# Patient Record
Sex: FEMALE | Race: BLACK OR AFRICAN AMERICAN | NOT HISPANIC OR LATINO | Employment: UNEMPLOYED | URBAN - METROPOLITAN AREA
[De-identification: names, ages, dates, MRNs, and addresses within clinical notes are randomized per-mention and may not be internally consistent; named-entity substitution may affect disease eponyms.]

---

## 2017-01-12 ENCOUNTER — HOSPITAL ENCOUNTER (EMERGENCY)
Facility: HOSPITAL | Age: 32
Discharge: HOME | End: 2017-01-12
Attending: EMERGENCY MEDICINE | Admitting: EMERGENCY MEDICINE
Payer: COMMERCIAL

## 2017-01-12 ENCOUNTER — APPOINTMENT (EMERGENCY)
Dept: RADIOLOGY | Facility: HOSPITAL | Age: 32
End: 2017-01-12
Payer: COMMERCIAL

## 2017-01-12 VITALS
HEART RATE: 91 BPM | SYSTOLIC BLOOD PRESSURE: 113 MMHG | OXYGEN SATURATION: 99 % | TEMPERATURE: 98.4 F | WEIGHT: 200 LBS | RESPIRATION RATE: 14 BRPM | DIASTOLIC BLOOD PRESSURE: 56 MMHG

## 2017-01-12 DIAGNOSIS — S93.409A ANKLE SPRAIN: Primary | ICD-10-CM

## 2017-01-12 PROCEDURE — 99283 EMERGENCY DEPT VISIT LOW MDM: CPT

## 2017-01-12 PROCEDURE — 73630 X-RAY EXAM OF FOOT: CPT

## 2017-01-12 PROCEDURE — 73610 X-RAY EXAM OF ANKLE: CPT

## 2017-01-12 PROCEDURE — 96372 THER/PROPH/DIAG INJ SC/IM: CPT

## 2017-01-12 RX ORDER — KETOROLAC TROMETHAMINE 30 MG/ML
30 INJECTION, SOLUTION INTRAMUSCULAR; INTRAVENOUS ONCE
Status: COMPLETED | OUTPATIENT
Start: 2017-01-12 | End: 2017-01-12

## 2017-01-12 RX ORDER — PHENTERMINE HYDROCHLORIDE 15 MG/1
15 CAPSULE ORAL EVERY MORNING
COMMUNITY
End: 2022-03-28 | Stop reason: ALTCHOICE

## 2017-01-12 RX ADMIN — KETOROLAC TROMETHAMINE 30 MG: 30 INJECTION, SOLUTION INTRAMUSCULAR at 10:25

## 2019-03-25 ENCOUNTER — HOSPITAL ENCOUNTER (EMERGENCY)
Facility: HOSPITAL | Age: 34
Discharge: HOME | End: 2019-03-25
Attending: EMERGENCY MEDICINE | Admitting: EMERGENCY MEDICINE
Payer: COMMERCIAL

## 2019-03-25 VITALS
SYSTOLIC BLOOD PRESSURE: 133 MMHG | WEIGHT: 193 LBS | DIASTOLIC BLOOD PRESSURE: 95 MMHG | RESPIRATION RATE: 16 BRPM | TEMPERATURE: 98.1 F | OXYGEN SATURATION: 100 % | BODY MASS INDEX: 32.62 KG/M2 | HEART RATE: 99 BPM

## 2019-03-25 DIAGNOSIS — S09.20XA: Primary | ICD-10-CM

## 2019-03-25 PROCEDURE — 99282 EMERGENCY DEPT VISIT SF MDM: CPT

## 2019-03-25 NOTE — ED PROVIDER NOTES
Pt Name: Felicitas Mckeon  MRN: [id number]  [place] 1985  Age/Sex: 34 y o  female  Date of evaluation: 3/25/2019  PCP: Rhoda Proctor MD    [address] [place] [address]    Chief Complaint   Patient presents with    Ear Problem     was hit with a baskekball in the Left ear on saturday and "it started ringing then it went muffeled now it feels like there is water in it"         [facility] presents to the Emergency Department complaining of left ear fullness  She was hit in the ear on Saturday with a basketball  Since then it has not felt normal   She feels like there is unusual pressure and echoing sound  She is concerned that there is water in there but she does not have significant pain  HPI      Past Medical and Surgical History    History reviewed  No pertinent past medical history  Past Surgical History:   Procedure Laterality Date    ABDOMINAL SURGERY         History reviewed  No pertinent family history  Social History     Tobacco Use    Smoking status: Current Every Day Smoker     Types: Cigarettes   Substance Use Topics    Alcohol use: Yes    Drug use: No              Allergies    No Known Allergies    Home Medications    Prior to Admission medications    Medication Sig Start Date End Date Taking? Authorizing Provider   phentermine 15 MG capsule Take 15 mg by mouth every morning    Historical Provider, MD           Review of Systems    Review of Systems   Constitutional: Negative for activity change, appetite change, chills, diaphoresis, fatigue and fever  HENT: Positive for ear pain and hearing loss  Negative for congestion, postnasal drip, rhinorrhea, sinus pressure, sneezing and sore throat  Eyes: Negative for pain and visual disturbance  Respiratory: Negative for cough, chest tightness and shortness of breath  Cardiovascular: Negative for chest pain, palpitations and leg swelling     Gastrointestinal: Negative for abdominal distention, abdominal pain, constipation, diarrhea, nausea and vomiting  Endocrine: Negative for polydipsia, polyphagia and polyuria  Genitourinary: Negative for decreased urine volume, difficulty urinating, dysuria, flank pain, frequency and hematuria  Musculoskeletal: Negative for arthralgias, gait problem, joint swelling and neck pain  Skin: Negative for pallor and rash  Allergic/Immunologic: Negative for immunocompromised state  Neurological: Negative for syncope, speech difficulty, weakness, light-headedness, numbness and headaches  All other systems reviewed and are negative  Physical Exam      ED Triage Vitals [03/25/19 1117]   Temperature Pulse Respirations Blood Pressure SpO2   98 1 °F (36 7 °C) 99 16 133/95 100 %      Temp Source Heart Rate Source Patient Position - Orthostatic VS BP Location FiO2 (%)   Tympanic Monitor Sitting Right arm --      Pain Score       8               Physical Exam   Constitutional: She is oriented to person, place, and time  She appears well-developed and well-nourished  No distress  HENT:   Head: Normocephalic and atraumatic  Left Ear: Tympanic membrane is perforated  Ears:    Nose: Nose normal    Mouth/Throat: Oropharynx is clear and moist    Eyes: Pupils are equal, round, and reactive to light  Conjunctivae, EOM and lids are normal    Neck: Normal range of motion  Neck supple  Cardiovascular: Normal rate, regular rhythm and normal heart sounds  Exam reveals no gallop and no friction rub  No murmur heard  Pulmonary/Chest: Effort normal and breath sounds normal  No accessory muscle usage  No respiratory distress  She has no wheezes  She has no rales  Abdominal: Soft  She exhibits no distension  There is no tenderness  There is no rebound and no guarding  Neurological: She is alert and oriented to person, place, and time  No cranial nerve deficit or sensory deficit  Skin: Skin is warm and dry  No rash noted  She is not diaphoretic  No erythema  Psychiatric: She has a normal mood and affect  Her speech is normal and behavior is normal  Judgment and thought content normal    Nursing note and vitals reviewed  Assessment and Plan    Alexi Renner is a 34 y o  female who presents with abnormal hearing/ ear fullness  Physical examination remarkable for TM rupture  Plan will be to refer to ENT as an outpatient  I discussed with the patient to avoid water in the ear  [facility]    Diagnostic Results        Medications - No data to display        FINAL IMPRESSION    Final diagnoses:   Traumatic rupture of tympanic membrane, initial encounter         DISPOSITION/PLAN      Time reflects when diagnosis was documented in both MDM as applicable and the Disposition within this note     Time User Action Codes Description Comment    3/25/2019 11:27 AM Caroline Jean Add [S09 [id number]] Traumatic rupture of tympanic membrane, initial encounter       ED Disposition     ED Disposition Condition Date/Time Comment    Discharge Stable Mon Mar 25, 2019 11:26 AM Alexi Renner discharge to home/self care              Follow-up Information     Follow up With Specialties Details Why Contact Info Additional Information    Hira Vieira MD [facility]  [address] [facility]  49  [facility] 43        [address] E[facility] Otolaryngology Schedule an appointment as soon as possible for a visit   [address] [facility] [address]  [address], [address] [place] [address], [id number]            PATIENT REFERRED TO:    Hira Vieira MD  [address] [place] [address]  [address]  300 [facility] ENT  120 [facility] [address] [phone]  Schedule an appointment as soon as possible for a visit         DISCHARGE MEDICATIONS:    Discharge Medication List as of 3/25/2019 11:28 AM      CONTINUE these medications which have NOT CHANGED    Details   phentermine 15 MG capsule Take 15 mg by mouth every morning, Until Discontinued, Historical Med             No discharge procedures on file           Camacho Bolus, DO     Camacho Bolus, DO  03/25/19 6152

## 2019-04-18 ENCOUNTER — APPOINTMENT (EMERGENCY)
Dept: RADIOLOGY | Facility: HOSPITAL | Age: 34
End: 2019-04-18
Payer: COMMERCIAL

## 2019-04-18 ENCOUNTER — HOSPITAL ENCOUNTER (EMERGENCY)
Facility: HOSPITAL | Age: 34
Discharge: HOME | End: 2019-04-18
Attending: EMERGENCY MEDICINE | Admitting: EMERGENCY MEDICINE
Payer: COMMERCIAL

## 2019-04-18 VITALS
RESPIRATION RATE: 20 BRPM | DIASTOLIC BLOOD PRESSURE: 77 MMHG | HEART RATE: 98 BPM | OXYGEN SATURATION: 100 % | TEMPERATURE: 99.1 F | SYSTOLIC BLOOD PRESSURE: 120 MMHG

## 2019-04-18 DIAGNOSIS — S90.31XA CONTUSION OF RIGHT FOOT, INITIAL ENCOUNTER: ICD-10-CM

## 2019-04-18 DIAGNOSIS — S91.311A LACERATION OF RIGHT FOOT, INITIAL ENCOUNTER: Primary | ICD-10-CM

## 2019-04-18 DIAGNOSIS — Z23 NEED FOR TETANUS, DIPHTHERIA, AND ACELLULAR PERTUSSIS (TDAP) VACCINE: ICD-10-CM

## 2019-04-18 PROCEDURE — 90471 IMMUNIZATION ADMIN: CPT

## 2019-04-18 PROCEDURE — 90715 TDAP VACCINE 7 YRS/> IM: CPT | Performed by: PHYSICIAN ASSISTANT

## 2019-04-18 PROCEDURE — 73630 X-RAY EXAM OF FOOT: CPT

## 2019-04-18 PROCEDURE — 99283 EMERGENCY DEPT VISIT LOW MDM: CPT

## 2019-04-18 PROCEDURE — 99284 EMERGENCY DEPT VISIT MOD MDM: CPT | Performed by: PHYSICIAN ASSISTANT

## 2019-04-18 RX ORDER — NAPROXEN 500 MG/1
500 TABLET ORAL 2 TIMES DAILY WITH MEALS
Qty: 10 TABLET | Refills: 0 | Status: SHIPPED | OUTPATIENT
Start: 2019-04-18 | End: 2022-03-28 | Stop reason: ALTCHOICE

## 2019-04-18 RX ORDER — CEPHALEXIN 500 MG/1
500 CAPSULE ORAL 4 TIMES DAILY
Qty: 28 CAPSULE | Refills: 0 | Status: SHIPPED | OUTPATIENT
Start: 2019-04-18 | End: 2019-04-25

## 2019-04-18 RX ADMIN — TETANUS TOXOID, REDUCED DIPHTHERIA TOXOID AND ACELLULAR PERTUSSIS VACCINE, ADSORBED 0.5 ML: 5; 2.5; 8; 8; 2.5 SUSPENSION INTRAMUSCULAR at 22:18

## 2020-01-27 ENCOUNTER — HOSPITAL ENCOUNTER (EMERGENCY)
Facility: HOSPITAL | Age: 35
Discharge: HOME | End: 2020-01-27
Attending: EMERGENCY MEDICINE | Admitting: EMERGENCY MEDICINE
Payer: COMMERCIAL

## 2020-01-27 ENCOUNTER — APPOINTMENT (EMERGENCY)
Dept: ULTRASOUND IMAGING | Facility: HOSPITAL | Age: 35
End: 2020-01-27
Payer: COMMERCIAL

## 2020-01-27 VITALS
RESPIRATION RATE: 20 BRPM | SYSTOLIC BLOOD PRESSURE: 135 MMHG | HEART RATE: 112 BPM | TEMPERATURE: 97.6 F | WEIGHT: 200 LBS | OXYGEN SATURATION: 100 % | BODY MASS INDEX: 33.8 KG/M2 | DIASTOLIC BLOOD PRESSURE: 87 MMHG

## 2020-01-27 DIAGNOSIS — N93.9 ABNORMAL VAGINAL BLEEDING: Primary | ICD-10-CM

## 2020-01-27 LAB
BASOPHILS # BLD AUTO: 0.1 THOUSANDS/ΜL (ref 0–0.1)
BASOPHILS NFR BLD AUTO: 1 % (ref 0–1)
BILIRUB UR QL STRIP: NEGATIVE
CLARITY UR: CLEAR
COLOR UR: YELLOW
EOSINOPHIL # BLD AUTO: 0.2 THOUSAND/ΜL (ref 0–0.4)
EOSINOPHIL NFR BLD AUTO: 3 % (ref 0–6)
ERYTHROCYTE [DISTWIDTH] IN BLOOD BY AUTOMATED COUNT: 14 %
EXT PREG TEST URINE: NEGATIVE
EXT. CONTROL ED NAV: NORMAL
GLUCOSE UR STRIP-MCNC: NEGATIVE MG/DL
HCT VFR BLD AUTO: 41 % (ref 36–46)
HGB BLD-MCNC: 13.5 G/DL (ref 12–16)
HGB UR QL STRIP.AUTO: NEGATIVE
KETONES UR STRIP-MCNC: NEGATIVE MG/DL
LEUKOCYTE ESTERASE UR QL STRIP: NEGATIVE
LYMPHOCYTES # BLD AUTO: 1.5 THOUSANDS/ΜL (ref 0.5–4)
LYMPHOCYTES NFR BLD AUTO: 19 % (ref 25–45)
MCH RBC QN AUTO: 27.8 PG (ref 26–34)
MCHC RBC AUTO-ENTMCNC: 32.9 G/DL (ref 31–36)
MCV RBC AUTO: 85 FL (ref 80–100)
MONOCYTES # BLD AUTO: 0.6 THOUSAND/ΜL (ref 0.2–0.9)
MONOCYTES NFR BLD AUTO: 8 % (ref 1–10)
NEUTROPHILS # BLD AUTO: 5.3 THOUSANDS/ΜL (ref 1.8–7.8)
NEUTS SEG NFR BLD AUTO: 69 % (ref 45–65)
NITRITE UR QL STRIP: NEGATIVE
PH UR STRIP.AUTO: 7 [PH]
PLATELET # BLD AUTO: 280 THOUSANDS/UL (ref 150–450)
PMV BLD AUTO: 9.6 FL (ref 8.9–12.7)
PROT UR STRIP-MCNC: NEGATIVE MG/DL
RBC # BLD AUTO: 4.85 MILLION/UL (ref 4–5.2)
SP GR UR STRIP.AUTO: 1.01 (ref 1–1.04)
UROBILINOGEN UA: NEGATIVE MG/DL
WBC # BLD AUTO: 7.8 THOUSAND/UL (ref 4.5–11)

## 2020-01-27 PROCEDURE — 36415 COLL VENOUS BLD VENIPUNCTURE: CPT | Performed by: PHYSICIAN ASSISTANT

## 2020-01-27 PROCEDURE — 76830 TRANSVAGINAL US NON-OB: CPT

## 2020-01-27 PROCEDURE — 85025 COMPLETE CBC W/AUTO DIFF WBC: CPT | Performed by: PHYSICIAN ASSISTANT

## 2020-01-27 PROCEDURE — 76856 US EXAM PELVIC COMPLETE: CPT

## 2020-01-27 PROCEDURE — 81003 URINALYSIS AUTO W/O SCOPE: CPT | Performed by: PHYSICIAN ASSISTANT

## 2020-01-27 PROCEDURE — 99282 EMERGENCY DEPT VISIT SF MDM: CPT | Performed by: PHYSICIAN ASSISTANT

## 2020-01-27 PROCEDURE — 81025 URINE PREGNANCY TEST: CPT | Performed by: PHYSICIAN ASSISTANT

## 2020-01-27 PROCEDURE — 99284 EMERGENCY DEPT VISIT MOD MDM: CPT

## 2020-01-27 NOTE — ED PROVIDER NOTES
History  Chief Complaint   Patient presents with    Vaginal Bleeding     Bleeding, then period, now spotting  Lower abdominal cramps  29 yo F presenting for evaluation of vaginal discharge  Pt reports 1/4/20 she had vaginal bleeding and passing large blood clots  Patient states that lasted for approximately 4-5 days and she went 3 days without any vaginal bleeding then she started her normal period on 1/14  She reports her  Last approximately 1 week then she went 3 days without any vaginal bleeding and now she started spotting again  She states that she does not need to wear any pain line is or pad and bleeding is mostly with wiping  Denies any dysuria but states she has been having some urinary urgency recently  She denies any vaginal discharge  She reports lower abdominal cramping  No diarrhea constipation nausea or vomiting  She did not take anything for pain prior to arrival   Not currently on any birth control  No concern for STDs  Prior to Admission Medications   Prescriptions Last Dose Informant Patient Reported? Taking?   naproxen (NAPROSYN) 500 mg tablet   No No   Sig: Take 1 tablet (500 mg total) by mouth 2 (two) times a day with meals for 5 days   phentermine 15 MG capsule   Yes No   Sig: Take 15 mg by mouth every morning      Facility-Administered Medications: None       History reviewed  No pertinent past medical history  Past Surgical History:   Procedure Laterality Date    ABDOMINAL SURGERY         History reviewed  No pertinent family history  I have reviewed and agree with the history as documented  Social History     Tobacco Use    Smoking status: Current Every Day Smoker     Types: Cigarettes    Smokeless tobacco: Never Used   Substance Use Topics    Alcohol use: Yes    Drug use: No        Review of Systems   All other systems reviewed and are negative  Physical Exam  Physical Exam   Constitutional: She is oriented to person, place, and time   She appears well-developed and well-nourished  No distress  HENT:   Head: Normocephalic and atraumatic  Right Ear: External ear normal    Left Ear: External ear normal    Eyes: Conjunctivae are normal    EOM grossly intact   Neck: Normal range of motion  Neck supple  No JVD present  Cardiovascular: Normal rate  Pulmonary/Chest: Effort normal  No stridor  No respiratory distress  Abdominal: Soft  She exhibits no distension  There is no tenderness  There is no guarding  Musculoskeletal:   FROM, steady gait, cap refill brisk, strength and sensation grossly intact throughout   Neurological: She is alert and oriented to person, place, and time  Skin: Skin is warm and dry  Capillary refill takes less than 2 seconds  She is not diaphoretic  Psychiatric: She has a normal mood and affect  Her behavior is normal    Nursing note and vitals reviewed        Vital Signs  ED Triage Vitals [01/27/20 1050]   Temperature Pulse Respirations Blood Pressure SpO2   97 6 °F (36 4 °C) (!) 112 20 135/87 100 %      Temp Source Heart Rate Source Patient Position - Orthostatic VS BP Location FiO2 (%)   Tympanic -- Lying Left arm --      Pain Score       --           Vitals:    01/27/20 1050   BP: 135/87   Pulse: (!) 112   Patient Position - Orthostatic VS: Lying         Visual Acuity      ED Medications  Medications - No data to display    Diagnostic Studies  Results Reviewed     Procedure Component Value Units Date/Time    UA w Reflex to Microscopic w Reflex to Culture [[id number]]  (Normal) Collected:  01/27/20 1112    Lab Status:  Final result Specimen:  Urine, Clean Catch Updated:  01/27/20 1138     Color, UA Yellow     Clarity, UA Clear     Specific Gravity, UA 1 010     pH, UA 7 0     Leukocytes, UA Negative     Nitrite, UA Negative     Protein, UA Negative mg/dl      Glucose, UA Negative mg/dl      Ketones, UA Negative mg/dl      Bilirubin, UA Negative     Blood, UA Negative     UROBILINOGEN UA Negative mg/dL     CBC and differential [[id number]]  (Abnormal) Collected:  01/27/20 1117    Lab Status:  Final result Specimen:  Blood from Arm, Right Updated:  01/27/20 1123     WBC 7 80 Thousand/uL      RBC 4 85 Million/uL      Hemoglobin 13 5 g/dL      Hematocrit 41 0 %      MCV 85 fL      MCH 27 8 pg      MCHC 32 9 g/dL      RDW 14 0 %      MPV 9 6 fL      Platelets 736 Thousands/uL      Neutrophils Relative 69 %      Lymphocytes Relative 19 %      Monocytes Relative 8 %      Eosinophils Relative 3 %      Basophils Relative 1 %      Neutrophils Absolute 5 30 Thousands/µL      Lymphocytes Absolute 1 50 Thousands/µL      Monocytes Absolute 0 60 Thousand/µL      Eosinophils Absolute 0 20 Thousand/µL      Basophils Absolute 0 10 Thousands/µL     POCT pregnancy, urine [[id number]]  (Normal) Resulted:  01/27/20 1113    Lab Status:  Final result Updated:  01/27/20 1115     EXT PREG TEST UR (Ref: Negative) negative     Control valid                 US pelvis complete w transvaginal   Final Result by Amarilis Fontenot MD (01/27 1157)       Normal                       Workstation performed: [id number]                    Procedures  Procedures         ED Course                               MDM  Number of Diagnoses or Management Options  Abnormal vaginal bleeding:   Diagnosis management comments: 29 yo F presenting for evaluation of abnormal uterine bleeding, no abnormalities on labs, urinalysis or TVUS, likely DUB, advised to f/u with obgyn as an outpatient    All labs and imaging discussed with patient, strict return to ED precautions discussed  Pt verbalizes understanding and agrees with plan  Pt is stable for discharge    Portions of the record may have been created with voice recognition software  Occasional wrong word or "sound a like" substitutions may have occurred due to the inherent limitations of voice recognition software  Read the chart carefully and recognize, using context, where substitutions have occurred            Disposition  Final diagnoses: Abnormal vaginal bleeding     Time reflects when diagnosis was documented in both MDM as applicable and the Disposition within this note     Time User Action Codes Description Comment    1/27/2020 12:15 PM Frida Martinez Add [N93 9] Abnormal vaginal bleeding       ED Disposition     ED Disposition Condition Date/Time Comment    Discharge Stable Mon Jan 27, 2020 12:15 PM Sheri Stone discharge to home/self care  Follow-up Information     Follow up With Specialties Details Why Contact Info    Maddison Santos MD Family Medicine Call in 1 day  [address] [place] [address] 43       [address] [facility] Obstetrics and Gynecology Call in 1 day  [address] [facility]  [place] [address] [facility]  [phone]            Discharge Medication List as of 1/27/2020 12:16 PM      CONTINUE these medications which have NOT CHANGED    Details   naproxen (NAPROSYN) 500 mg tablet Take 1 tablet (500 mg total) by mouth 2 (two) times a day with meals for 5 days, Starting Thu 4/18/2019, Until Tue 4/23/2019, Print      phentermine 15 MG capsule Take 15 mg by mouth every morning, Until Discontinued, Historical Med           No discharge procedures on file      ED Provider  Electronically Signed by           Joshua Vizcaino PA-C  01/27/20 4689

## 2022-03-28 ENCOUNTER — OFFICE VISIT (OUTPATIENT)
Dept: INTERNAL MEDICINE CLINIC | Facility: CLINIC | Age: 37
End: 2022-03-28

## 2022-03-28 VITALS
BODY MASS INDEX: 35.39 KG/M2 | HEIGHT: 65 IN | OXYGEN SATURATION: 97 % | TEMPERATURE: 97.9 F | WEIGHT: 212.4 LBS | DIASTOLIC BLOOD PRESSURE: 81 MMHG | HEART RATE: 94 BPM | SYSTOLIC BLOOD PRESSURE: 123 MMHG

## 2022-03-28 DIAGNOSIS — J39.2 THROAT IRRITATION: Primary | ICD-10-CM

## 2022-03-28 DIAGNOSIS — E04.1 THYROID NODULE: ICD-10-CM

## 2022-03-28 DIAGNOSIS — Z00.00 HEALTHCARE MAINTENANCE: ICD-10-CM

## 2022-03-28 PROCEDURE — 99203 OFFICE O/P NEW LOW 30 MIN: CPT | Performed by: INTERNAL MEDICINE

## 2022-03-28 RX ORDER — METRONIDAZOLE 500 MG/1
TABLET ORAL
COMMUNITY
Start: 2021-12-21 | End: 2022-03-28 | Stop reason: ALTCHOICE

## 2022-03-28 RX ORDER — FLUCONAZOLE 150 MG/1
TABLET ORAL
COMMUNITY
Start: 2021-12-21 | End: 2022-03-28 | Stop reason: ALTCHOICE

## 2022-03-28 NOTE — PROGRESS NOTES
[facility] Visit Note  Lawrence Esquivel 31 y o  female   MRN: [id number]    Assessment and Plan      Diagnoses and all orders for this visit:    Healthcare maintenance  -     CBC and differential; Future  -     Basic metabolic panel; Future  -     HIV 1/2 ANTIGEN/ANTIBODY (4TH GENERATION) W REFLEX [facility]; Future  -     Hepatitis C Ab W/Refl To HCV RNA, Qn, PCR; Future    Throat Irritation  Thyroid nodule  -     TSH, 3rd generation with Free T4 reflex; Future  -     US thyroid; Future  -     Ambulatory Referral to Surgical Oncology; Future    Patient with a thyroid nodule of her right hemisphere deemed benign from workup in 2015  Here with throat itchiness for the past 7 years after her thyroid biopsy, and she believes the biopsy might have caused this  Low likelihood to be related to the biopsy given the duration of the symptoms  Most likely secondary to mass effect from the mass  Will order a TSH and US of the thyroid for continued maintenance  Will also order an ambulatory referral to Surg Onc for assessment of mass  If TSH is abnormal, can consider levothyroxine as this might help reduce the size of the nodule and help with the mass effect  Healthcare maintenance  -     CBC and differential; Future  -     Basic metabolic panel; Future  -     HIV 1/2 ANTIGEN/ANTIBODY (4TH GENERATION) W REFLEX [facility]; Future  -     Hepatitis C Ab W/Refl To HCV RNA, Qn, PCR; Future    Patient has an appointment with her OBGYN soon and is deferring referal at this time   Refusing vaccines at this time  Schedule a follow-up appointment in 6 months  Chief Complaint: Itchy throat  Subjective     History of Present Illness:  Lawrence Esquivel is a 31 y o  female with pmh depression, bipolar, adhd not on medications, Thyroid nodule with U/S and biopsy workup in 2015 showing no malignancy who presents to the clinic today to initiate care       Her main concern today is an itchy scratchy throat that has been bothering her for the last 7 years  This started after her thyroid biopsy and has been intermittent but consistently present since that time  It is an itchy sensation, worse at night, she has a dry cough occasionally with this itchy sensation  Drinking water sometimes helps, smoking makes it worse  Patient smokes 05  Packs of cigarettes per day, alcohol socially, no drug use  HANY: 0 5 packs a day, social drinker, no drug use  GEE, manjit becerra    Review of Systems   Constitutional: Positive for fatigue  Negative for chills and fever  HENT: Negative for nosebleeds, sore throat and trouble swallowing  Respiratory: Positive for cough (dry)  Negative for chest tightness, shortness of breath, wheezing and stridor  Cardiovascular: Negative for chest pain  Gastrointestinal: Negative for abdominal pain, constipation, diarrhea and nausea  Endocrine: Positive for cold intolerance and polydipsia (last summer, drinks a gallon of water)  Genitourinary: Negative for dysuria and hematuria  Musculoskeletal: Positive for back pain (chronic lower back pain)  Negative for myalgias  Skin: Negative for pallor and rash  Neurological: Negative for seizures and headaches  Psychiatric/Behavioral: Negative for agitation  No current outpatient medications on file    Past Medical History:   Diagnosis Date    Anxiety     Depression      Past Surgical History:   Procedure Laterality Date    ABDOMINAL SURGERY       Social History     Socioeconomic History    Marital status: Single     Spouse name: Not on file    Number of children: Not on file    Years of education: Not on file    Highest education level: Not on file   Occupational History    Not on file   Tobacco Use    Smoking status: Current Every Day Smoker     Packs/day: 0 50     Types: Cigarettes    Smokeless tobacco: Never Used   Vaping Use    Vaping Use: Never used   Substance and Sexual Activity    Alcohol use: Yes     Comment: occ    Drug use: No    Sexual activity: Not on file   Other Topics Concern    Not on file   Social History Narrative    Not on file     Social Determinants of Health     Financial Resource Strain: Low Risk     Difficulty of Paying Living Expenses: Not hard at all   Food Insecurity: No Food Insecurity    Worried About Running Out of Food in the Last Year: Never true    Mree of Food in the Last Year: Never true   Transportation Needs: No Transportation Needs    Lack of Transportation (Medical): No    Lack of Transportation (Non-Medical): No   Physical Activity: Not on file   Stress: Not on file   Social Connections: Not on file   Intimate Partner Violence: Not on file   Housing Stability: Low Risk     Unable to Pay for Housing in the Last Year: No    Number of Places Lived in the Last Year: 1    Unstable Housing in the Last Year: No     Family History   Problem Relation Age of Onset    Diabetes Mother     Diabetes Father     No Known Problems Son      No Known Allergies    Objective     Vitals:    03/28/22 1306   BP: 123/81   BP Location: Left arm   Patient Position: Sitting   Cuff Size: Large   Pulse: 94   Temp: 97 9 °F (36 6 °C)   TempSrc: Temporal   SpO2: 97%   Weight: 96 3 kg (212 lb 6 4 oz)   Height: 5' 5" (1 651 m)       Physical exam:     GENERAL: NAD   HEENT:  NC/AT, PERRL, EOMI, no scleral icterus, enlarged right hemispere of thyroid  CARDIAC:  RRR, +S1/S2, no S3/S4 heard, no m/g/r  PULMONARY:  CTA B/L, no wheezing/rales/rhonci, non-labored breathing  ABDOMEN:  Soft, NT/ND,no rebound/guarding/rigidity  Extremities:  No edema, cyanosis, or clubbing  NEUROLOGIC: Grossly intact  SKIN:  No rashes or erythema noted on exposed skin  Psych: Normal affect      Maury Jhaveri MD   PGY-1 Internal Medicine  [place]    ==  PLEASE NOTE:  This encounter was completed utilizing the M- Modal/Fluency Direct Speech Voice Recognition Software  Grammatical errors, random word insertions, pronoun errors and incomplete sentences are occasional consequences of the system due to software limitations, ambient noise and hardware issues  These may be missed by proof reading prior to affixing electronic signature  Any questions or concerns about the content, text or information contained within the body of this dictation should be directly addressed to the physician for clarification  Please do not hesitate to call me directly if you have any any questions or concerns

## 2022-03-28 NOTE — PATIENT INSTRUCTIONS
I put in an order for your thyroid ultrasound  I also put in some lab work  We'll see you again in 6 months

## 2022-03-29 ENCOUNTER — TELEPHONE (OUTPATIENT)
Dept: HEMATOLOGY ONCOLOGY | Facility: CLINIC | Age: 37
End: 2022-03-29

## 2022-03-30 ENCOUNTER — TELEPHONE (OUTPATIENT)
Dept: HEMATOLOGY ONCOLOGY | Facility: CLINIC | Age: 37
End: 2022-03-30

## 2022-03-30 NOTE — TELEPHONE ENCOUNTER
New Patient Intake Form   Patient Details:    Whitney Lujan  1985    Appointment Information   Who is calling to schedule? Patient   If not self, what is the caller's name? Please put name of RBC nurse as well  self   Referring provider Trang Colon MD   What is the diagnosis? Thyroid nodule     Is there a confirmed tissue diagnosis? No   Is there a biopsy ordered or pending? Please specify dates   No     Is patient aware of diagnosis? Yes   Have you had any imaging or labs done? If yes, where? (If imaging done outside of [facility], please remind patient to bring a disk ) Yes, [facility]     If imaging done at outside facility, did you instruct patient to obtain discs and bring to visit? No   Have you been seen by another Oncologist/Hematologist?  If so, who and where? No   Are the records in [facility] or Care Everywhere? Yes   Does the patient have records at another facility/hospital? No   If yes, Name of facility, city and state where facility is located  Did you instruct patient to have records faxed to [facility] and provide rightfax number? No   Preferred [place]   Is the patient willing to be seen by another provider? (This is for breast patients only) N/A     Did you send new patient paperwork? Email or mail? Yes, sent to email address on file     Miscellaneous Information: Scheduled appointment 4/12 @ 3:15PM Dr Clifton Salinas

## 2022-04-05 ENCOUNTER — TELEPHONE (OUTPATIENT)
Dept: SURGICAL ONCOLOGY | Facility: CLINIC | Age: 37
End: 2022-04-05

## 2022-04-05 NOTE — TELEPHONE ENCOUNTER
Tc to patient and left message  Pt scheduled for consult appt with Dr Ilya Hernandez on 4/12  Last US of the thryoid and thyroid biopsy was done in 2015  Instructed pt to call central scheduling at [phone] to schedule thyroid US before coming in for consult appt as Dr Ilya Hernandez will need updated imaging of her thyroid  Depending on when she is able to get her US done, we may need to reschedule her consult  Instructed to call back with any questions or concerns

## 2022-04-05 NOTE — TELEPHONE ENCOUNTER
Received call back from patient  Provided her with the number for central scheduling and instructed her to have her thyroid US completed prior to coming in to see Dr Sheri Jacobs  Pt verbalized understanding

## 2022-04-06 ENCOUNTER — HOSPITAL ENCOUNTER (OUTPATIENT)
Dept: RADIOLOGY | Facility: HOSPITAL | Age: 37
End: 2022-04-06
Payer: MEDICARE

## 2022-04-06 ENCOUNTER — APPOINTMENT (OUTPATIENT)
Dept: LAB | Facility: CLINIC | Age: 37
End: 2022-04-06
Payer: MEDICARE

## 2022-04-06 DIAGNOSIS — Z00.00 HEALTHCARE MAINTENANCE: ICD-10-CM

## 2022-04-06 DIAGNOSIS — E04.1 THYROID NODULE: ICD-10-CM

## 2022-04-06 LAB
BASOPHILS # BLD AUTO: 0.04 THOUSANDS/ΜL (ref 0–0.1)
BASOPHILS NFR BLD AUTO: 1 % (ref 0–1)
EOSINOPHIL # BLD AUTO: 0.37 THOUSAND/ΜL (ref 0–0.61)
EOSINOPHIL NFR BLD AUTO: 5 % (ref 0–6)
ERYTHROCYTE [DISTWIDTH] IN BLOOD BY AUTOMATED COUNT: 14.1 % (ref 11.6–15.1)
HCT VFR BLD AUTO: 40.5 % (ref 34.8–46.1)
HGB BLD-MCNC: 12.8 G/DL (ref 11.5–15.4)
IMM GRANULOCYTES # BLD AUTO: 0.03 THOUSAND/UL (ref 0–0.2)
IMM GRANULOCYTES NFR BLD AUTO: 0 % (ref 0–2)
LYMPHOCYTES # BLD AUTO: 2.14 THOUSANDS/ΜL (ref 0.6–4.47)
LYMPHOCYTES NFR BLD AUTO: 30 % (ref 14–44)
MCH RBC QN AUTO: 26.3 PG (ref 26.8–34.3)
MCHC RBC AUTO-ENTMCNC: 31.6 G/DL (ref 31.4–37.4)
MCV RBC AUTO: 83 FL (ref 82–98)
MONOCYTES # BLD AUTO: 0.28 THOUSAND/ΜL (ref 0.17–1.22)
MONOCYTES NFR BLD AUTO: 4 % (ref 4–12)
NEUTROPHILS # BLD AUTO: 4.38 THOUSANDS/ΜL (ref 1.85–7.62)
NEUTS SEG NFR BLD AUTO: 60 % (ref 43–75)
NRBC BLD AUTO-RTO: 0 /100 WBCS
PLATELET # BLD AUTO: 292 THOUSANDS/UL (ref 149–390)
PMV BLD AUTO: 11.8 FL (ref 8.9–12.7)
RBC # BLD AUTO: 4.87 MILLION/UL (ref 3.81–5.12)
TSH SERPL DL<=0.05 MIU/L-ACNC: 0.98 UIU/ML (ref 0.45–4.5)
WBC # BLD AUTO: 7.24 THOUSAND/UL (ref 4.31–10.16)

## 2022-04-06 PROCEDURE — 85025 COMPLETE CBC W/AUTO DIFF WBC: CPT

## 2022-04-06 PROCEDURE — 84443 ASSAY THYROID STIM HORMONE: CPT

## 2022-04-06 PROCEDURE — 87521 HEPATITIS C PROBE&RVRS TRNSC: CPT

## 2022-04-07 ENCOUNTER — ANNUAL EXAM (OUTPATIENT)
Dept: OBGYN CLINIC | Facility: CLINIC | Age: 37
End: 2022-04-07
Payer: MEDICARE

## 2022-04-07 ENCOUNTER — TELEPHONE (OUTPATIENT)
Dept: HEMATOLOGY ONCOLOGY | Facility: CLINIC | Age: 37
End: 2022-04-07

## 2022-04-07 ENCOUNTER — TELEPHONE (OUTPATIENT)
Dept: SURGICAL ONCOLOGY | Facility: CLINIC | Age: 37
End: 2022-04-07

## 2022-04-07 VITALS
BODY MASS INDEX: 35.16 KG/M2 | WEIGHT: 211 LBS | SYSTOLIC BLOOD PRESSURE: 118 MMHG | HEIGHT: 65 IN | DIASTOLIC BLOOD PRESSURE: 80 MMHG

## 2022-04-07 DIAGNOSIS — Z01.419 WOMEN'S ANNUAL ROUTINE GYNECOLOGICAL EXAMINATION: Primary | ICD-10-CM

## 2022-04-07 DIAGNOSIS — N76.0 RECURRENT VAGINITIS: ICD-10-CM

## 2022-04-07 PROCEDURE — G0476 HPV COMBO ASSAY CA SCREEN: HCPCS | Performed by: NURSE PRACTITIONER

## 2022-04-07 PROCEDURE — 99385 PREV VISIT NEW AGE 18-39: CPT | Performed by: NURSE PRACTITIONER

## 2022-04-07 PROCEDURE — G0145 SCR C/V CYTO,THINLAYER,RESCR: HCPCS | Performed by: NURSE PRACTITIONER

## 2022-04-07 NOTE — TELEPHONE ENCOUNTER
Spoke with pt about referral placed to see Dr Khalif Vanegas  Instructed pt that we would need an updated thyroid US done prior to her coming in for a consult appt  Pt had US scheduled yesterday, but was a no show  Pt encouraged to reschedule appt  Offered to provide number to central scheduling  Pt declined and said she had the number  Explained to pt that we could schedule her for an office visit with Dr Khalif Vanegas after her thyroid US was done  Pt verbalized understanding

## 2022-04-07 NOTE — PROGRESS NOTES
Subjective    HPI:     Cha Otero is a 31 y o  female  She is a  3 Para 1, x1 , x2 ectopic pregnancies  Her menstrual cycles are regular periods every 30 days  Her current method of contraception includes none  She complains of low libido at times with intimacy  Not , in a stable relationship She complains of /GI and Gyn complaints  States that she gets a yeast infection after every period  Has been happening since she had BV and took flagyl  Has been happening for the last 3 months  Gets itchy and clumpy discharge  Takes OTC Azo and resolves on its own  She feels safe at home  She complains of depression/anxiety  She meditates and does yoga to help cope  Medical, surgical and family history reviewed  Her dental care is not done - reviewed the importance of routine dental health and it's relation to heart health  She eats a healthy diet and does not exercise regularly  She is happy with her weight  Gynecologic History    Patient's last menstrual period was 2022  Gardasil Vaccine Series: no  Last Pap: 2007  Results were: abnormal - LGSIL    Obstetric History    OB History    Para Term  AB Living   3 1 1   2 1   SAB IAB Ectopic Multiple Live Births       2   1      # Outcome Date GA Lbr Homar/2nd Weight Sex Delivery Anes PTL Lv   3 Term 07   3969 g (8 lb 12 oz) M Vag-Spont   NITA   2 Ectopic            1 Ectopic                The following portions of the patient's history were reviewed and updated as appropriate: allergies, current medications, past family history, past medical history, past social history, past surgical history and problem list     Review of Systems    Pertinent items are noted in HPI  Objective    Physical Exam  Constitutional:       Appearance: Normal appearance  She is well-developed  Genitourinary:      Vulva, bladder and urethral meatus normal       No lesions in the vagina        Right Labia: No rash, tenderness, lesions, skin changes or Bartholin's cyst      Left Labia: No tenderness, lesions, skin changes, Bartholin's cyst or rash  No labial fusion noted  No inguinal adenopathy present in the right or left side  No vaginal discharge, erythema, tenderness, bleeding or granulation tissue  No vaginal prolapse present  No vaginal atrophy present  Right Adnexa: not tender, not full and no mass present  Left Adnexa: not tender, not full and no mass present  Cervix is parous  Cervical nabothian cyst present  No cervical motion tenderness, discharge, friability, lesion or polyp  Uterus is not enlarged, tender, irregular or prolapsed  No uterine mass detected  Uterus is anteverted  Pelvic exam was performed with patient in the lithotomy position  Breasts: Breasts are symmetrical       Right: No inverted nipple, mass, nipple discharge, skin change, tenderness, axillary adenopathy or supraclavicular adenopathy  Left: No inverted nipple, mass, nipple discharge, skin change, tenderness, axillary adenopathy or supraclavicular adenopathy  HENT:      Head: Normocephalic and atraumatic  Neck:      Thyroid: Thyroid mass (right side palpated on exam) and thyromegaly present  Cardiovascular:      Rate and Rhythm: Normal rate and regular rhythm  Heart sounds: Normal heart sounds, S1 normal and S2 normal    Pulmonary:      Effort: Pulmonary effort is normal       Breath sounds: Normal breath sounds  Abdominal:      General: Bowel sounds are normal  There is no distension  Palpations: Abdomen is soft  There is no mass  Tenderness: There is no abdominal tenderness  There is no guarding  Hernia: There is no hernia in the left inguinal area or right inguinal area  Musculoskeletal:      Cervical back: Neck supple  Lymphadenopathy:      Cervical: No cervical adenopathy  Upper Body:      Right upper body: No supraclavicular or axillary adenopathy  Left upper body: No supraclavicular or axillary adenopathy  Lower Body: No right inguinal adenopathy  No left inguinal adenopathy  Neurological:      Mental Status: She is alert  Skin:     General: Skin is warm and dry  Findings: No rash  Psychiatric:         Attention and Perception: Attention and perception normal          Mood and Affect: Mood and affect normal          Speech: Speech normal          Behavior: Behavior is cooperative  Thought Content: Thought content normal          Cognition and Memory: Cognition and memory normal          Judgment: Judgment normal    Vitals and nursing note reviewed  Assessment and Plan    Alicia Pablo was seen today for gynecologic exam     Diagnoses and all orders for this visit:    Women's annual routine gynecological examination    Recurrent vaginitis      Patient informed of a Stable GYN exam  A pap smear was performed  I have discussed the importance of exercise and healthy diet as well as adequate intake of calcium and vitamin D  The current ASCCP guidelines were reviewed  The low risk patient will receive pap smear screening every 3 years until the age of 34 and then every 3 to 5 years with HPV co-testing from the ages of 33-67  I emphasized the importance of an annual pelvic and breast exam  A yearly mammogram is recommended for breast cancer screening starting at age 36  Continue use of Azo as needed for recurrent post cycle vaginitis  She will sign release of information to get records [facility] from 2009 to determine if she still has one fallopian tube  Results will be released to [facility], if abnormal will call to review and discuss treatment plan  All questions have been answered to her satisfaction  Follow up in: 1 year or sooner if needed

## 2022-04-08 ENCOUNTER — TELEPHONE (OUTPATIENT)
Dept: INTERNAL MEDICINE CLINIC | Facility: CLINIC | Age: 37
End: 2022-04-08

## 2022-04-08 LAB — MISCELLANEOUS LAB TEST RESULT: NORMAL

## 2022-04-08 NOTE — TELEPHONE ENCOUNTER
Patient called to get her Hep C results could not see it on her My Chart  Please call to give her the results  Phone number confirmed

## 2022-04-10 ENCOUNTER — HOSPITAL ENCOUNTER (OUTPATIENT)
Dept: RADIOLOGY | Facility: HOSPITAL | Age: 37
Discharge: HOME | End: 2022-04-10
Payer: MEDICARE

## 2022-04-10 DIAGNOSIS — E04.1 THYROID NODULE: ICD-10-CM

## 2022-04-10 LAB
HPV HR 12 DNA CVX QL NAA+PROBE: NEGATIVE
HPV16 DNA CVX QL NAA+PROBE: NEGATIVE
HPV18 DNA CVX QL NAA+PROBE: NEGATIVE

## 2022-04-10 PROCEDURE — 76536 US EXAM OF HEAD AND NECK: CPT

## 2022-04-11 ENCOUNTER — OFFICE VISIT (OUTPATIENT)
Dept: SURGERY | Facility: CLINIC | Age: 37
End: 2022-04-11
Payer: MEDICARE

## 2022-04-11 VITALS
BODY MASS INDEX: 35.02 KG/M2 | WEIGHT: 210.2 LBS | TEMPERATURE: 98.7 F | HEIGHT: 65 IN | SYSTOLIC BLOOD PRESSURE: 124 MMHG | HEART RATE: 97 BPM | DIASTOLIC BLOOD PRESSURE: 69 MMHG | OXYGEN SATURATION: 97 %

## 2022-04-11 DIAGNOSIS — Z11.4 ENCOUNTER FOR SCREENING FOR HUMAN IMMUNODEFICIENCY VIRUS (HIV): ICD-10-CM

## 2022-04-11 DIAGNOSIS — Z01.812 BLOOD TESTS PRIOR TO TREATMENT OR PROCEDURE: ICD-10-CM

## 2022-04-11 DIAGNOSIS — Z20.6 CONTACT WITH AND (SUSPECTED) EXPOSURE TO HUMAN IMMUNODEFICIENCY VIRUS (HIV): ICD-10-CM

## 2022-04-11 DIAGNOSIS — Z20.2 CONTACT WITH AND (SUSPECTED) EXPOSURE TO INFECTIONS WITH A PREDOMINANTLY SEXUAL MODE OF TRANSMISSION: Primary | ICD-10-CM

## 2022-04-11 DIAGNOSIS — Z77.21 CONTACT WITH AND (SUSPECTED) EXPOSURE TO POTENTIALLY HAZARDOUS BODY FLUIDS: ICD-10-CM

## 2022-04-11 DIAGNOSIS — Z79.899 ENCOUNTER FOR LONG-TERM (CURRENT) USE OF OTHER MEDICATIONS: ICD-10-CM

## 2022-04-11 DIAGNOSIS — Z11.3 SCREENING FOR STD (SEXUALLY TRANSMITTED DISEASE): ICD-10-CM

## 2022-04-11 DIAGNOSIS — Z32.00 ENCOUNTER FOR PREGNANCY TEST, RESULT UNKNOWN: ICD-10-CM

## 2022-04-11 PROCEDURE — 99204 OFFICE O/P NEW MOD 45 MIN: CPT | Performed by: NURSE PRACTITIONER

## 2022-04-11 RX ORDER — EMTRICITABINE AND TENOFOVIR DISOPROXIL FUMARATE 200; 300 MG/1; MG/1
1 TABLET, FILM COATED ORAL DAILY
Qty: 90 TABLET | Refills: 0 | Status: SHIPPED | OUTPATIENT
Start: 2022-04-11

## 2022-04-11 NOTE — PROGRESS NOTES
Assessment/Plan:    · Reviewed all labs  · Next visit labs: HIV combo, CMP, pregnancy, PRP, and CT/GC  · Take Truvada 1 tablet daily by mouth  90 tablets, No refills  · Follow up visit in 3 months with lab work complete blood work 1 week prior to next visit  · Stressed the importance of 100% medication adherence and to take his medications the same time every day  · Stressed the importance of wearing condoms to prevent the transmission of HIV and STD's  · Avoid taking vitamin, herbal medicine, or calcium products within 6 hours of taking Descovy  · Call the office with any side effects, adverse effects, or questions or concerns  · Pt denies any allergies  · Discussed side effects  Practicing safe sex using a condom for each sexually encounter  Condoms offer the best protection against STD's by acting as a physical barrier to prevent the exchange of semen, vaginal fluids, and blood between partners  Condoms are not a 100% effective in protection  · Reducing the number of sexual partners can also help to reduce the risk of the transmission of STD's along with regular STD screening  · Educated about HIV and STD and they are transited through sexual secretions  · Questions asked and answered  · Call office with any questions or concern       Diagnoses and all orders for this visit:    Contact with and (suspected) exposure to infections with a predominantly sexual mode of transmission  -     Chlamydia/GC amplified DNA by PCR; Future  -     RPR; Future    Contact with and (suspected) exposure to potentially hazardous body fluids  -     Chlamydia/GC amplified DNA by PCR; Future  -     RPR; Future    Encounter for screening for human immunodeficiency virus (HIV)  -     HIV 1/2 Antigen/Antibody (4th Generation) w Reflex [facility]; Future    Encounter for pregnancy test, result unknown  -     hCG, quantitative; Future    Blood tests prior to treatment or procedure  -     Comprehensive metabolic panel;  Future  - Chlamydia/GC amplified DNA by PCR; Future  -     RPR; Future  -     HIV 1/2 Antigen/Antibody (4th Generation) w Reflex [facility]; Future  -     hCG, quantitative; Future    Encounter for long-term (current) use of other medications  -     Comprehensive metabolic panel; Future  -     Chlamydia/GC amplified DNA by PCR; Future  -     RPR; Future  -     HIV 1/2 Antigen/Antibody (4th Generation) w Reflex [facility]; Future  -     hCG, quantitative; Future    Contact with and (suspected) exposure to human immunodeficiency virus (hiv)  -     emtricitabine-tenofovir (TRUVADA) 200-300 mg per tablet; Take 1 tablet by mouth daily    Screening for STD (sexually transmitted disease)          Subjective:      Patient ID: Aniceto Hood is a 31 y o  female  HPI  Pt presents to the office for initiation of PrEP  Pt is a 32 yo female who is a high risk for HIV and STDs  Pt has called [facility] for PrEP services  PMH: thyroid nodule, obesity, and  3 Para 1, x1 , x 2 ectopic  pregnancies  Pt reports she is doing good  Pt had annual GYN exam 24  Pt is bisexual and heard about PrEP through our office when she came into get tested for HIV  Pt was not aware of these services and is glad they were recommended  Pt is currently not in a sexual relationship but was with a male partner and they had an open relationship  Pt does not use condoms  The following portions of the patient's history were reviewed and updated as appropriate: allergies, current medications, past family history, past medical history, past social history and problem list     Review of Systems   Constitutional: Negative  HENT: Negative  Objective:      /69 (BP Location: Right arm, Patient Position: Sitting, Cuff Size: Standard)   Pulse 97   Temp 98 7 °F (37 1 °C)   Ht 5' 5" (1 651 m)   Wt 95 3 kg (210 lb 3 2 oz)   LMP 2022   SpO2 97%   BMI 34 98 kg/m²          Physical Exam  Vitals and nursing note reviewed  Constitutional:       Appearance: Normal appearance  Cardiovascular:      Rate and Rhythm: Normal rate and regular rhythm  Chest Wall: PMI is not displaced  Pulses: Normal pulses  Heart sounds: Normal heart sounds  Pulmonary:      Effort: Pulmonary effort is normal       Breath sounds: Normal breath sounds  Abdominal:      General: Bowel sounds are normal       Palpations: Abdomen is soft  Skin:     General: Skin is warm and dry  Capillary Refill: Capillary refill takes less than 2 seconds  Neurological:      Mental Status: She is alert and oriented to person, place, and time  Psychiatric:         Mood and Affect: Mood normal          Behavior: Behavior normal          Thought Content: Thought content normal          Judgment: Judgment normal                 CHIEF CONCERN(S)    Patient's last menstrual period was 04/01/2022  LPS April 5 ,2022    Birth Control Method  N/a    Condom Used: No     Sexual Preference :  Bisexual    Date of Last Sexual Exposure: 3/2/2022    # of Partners: Last 30 days 1, Last 80 days 1 and Last Year 3    Sexual Practices: Oral, Vaginal and Anal      Previously Sexually Transmitted Diseases  Type Date Source of Care Treatment Comment   [facility]    CT [facility]               Test Date Results   RPR 4/6/2022 negative   HIv 4/6/2022 negative   GC 4/6/2022 negative   CT 4/6/2022 negative         1  CURRENT RISK BEHAVIOR(S)    Unprotected sex with multiple/anonymous partners, sex under the influence of drugs or alcohol and Sex in exchange for drugs or money     PREVIOUS SUCCESSES    Used condoms when having sex and Discussed condom use prior to having sex with partner(s)        3  SAFER GOAL BEHAVIOR(S)    Pre-exposure prophylaxis (PrEP) and Get tested if condom breaks/ leaks          4   PERSON ACTION PLAN:    > BARRIERS:    No condom use or discussions    > BENEFITS:          > ACTION STEPS:      Discuss condom use prior to having sex with partner(s)          4  REFERRALS:

## 2022-04-11 NOTE — PROGRESS NOTES
Assessment/Plan:  · Reviewed all labs  · Next visit labs: HIV combo, CMP, pregnancy, PRP, and CT/GC  · Take Truvada 1 tablet daily by mouth  90 tablets, No refills  · Follow up visit in 3 months with lab work complete blood work 1 week prior to next visit  · Stressed the importance of 100% medication adherence and to take his medications the same time every day  · Stressed the importance of wearing condoms to prevent the transmission of HIV and STD's  · Avoid taking vitamin, herbal medicine, or calcium products within 6 hours of taking Descovy  · Call the office with any side effects, adverse effects, or questions or concerns  · Pt denies any allergies  · Discussed side effects  Practicing safe sex using a condom for each sexually encounter  Condoms offer the best protection against STD's by acting as a physical barrier to prevent the exchange of semen, vaginal fluids, and blood between partners  Condoms are not a 100% effective in protection  · Reducing the number of sexual partners can also help to reduce the risk of the transmission of STD's along with regular STD screening  {Assess/PlanSmarThe [facility] Hospitals:76189}      Subjective:      Patient ID: Yaya Flood is a 31 y o  female  HPI  Pt presents to the office for initiation of PrEP  Pt is a 32 yo female who is a high risk for HIV and STDs  Pt has called [facility] for PrEP services  PMH: thyroid nodule, obesity, and  3 Para 1, x1 , x 2 ectopic  pregnancies    Pt reports   {Common ambulatory SmartLinks:89411}    Review of Systems      Objective:      LMP 2022          Physical Exam

## 2022-04-14 ENCOUNTER — TELEPHONE (OUTPATIENT)
Dept: OBGYN CLINIC | Facility: CLINIC | Age: 37
End: 2022-04-14

## 2022-04-14 LAB
LAB AP GYN PRIMARY INTERPRETATION: NORMAL
LAB AP LMP: NORMAL
Lab: NORMAL
PATH INTERP SPEC-IMP: NORMAL

## 2022-04-15 ENCOUNTER — TELEPHONE (OUTPATIENT)
Dept: OBGYN CLINIC | Facility: CLINIC | Age: 37
End: 2022-04-15

## 2022-04-15 DIAGNOSIS — N76.0 BV (BACTERIAL VAGINOSIS): Primary | ICD-10-CM

## 2022-04-15 DIAGNOSIS — B96.89 BV (BACTERIAL VAGINOSIS): Primary | ICD-10-CM

## 2022-04-15 RX ORDER — METRONIDAZOLE 7.5 MG/G
1 GEL VAGINAL
Qty: 70 G | Refills: 0 | Status: SHIPPED | OUTPATIENT
Start: 2022-04-15 | End: 2022-04-20

## 2022-04-20 ENCOUNTER — TELEPHONE (OUTPATIENT)
Dept: SURGICAL ONCOLOGY | Facility: CLINIC | Age: 37
End: 2022-04-20

## 2022-04-20 NOTE — TELEPHONE ENCOUNTER
Called and spoke with patient to reschedule consult with Dr Saavedra  She declined to reschedule at this time due to her ultrasound results being 'fine' and doesn't feel like the mass needs to be removed  She agreed to call if she changes her mind

## 2022-05-20 ENCOUNTER — TELEPHONE (OUTPATIENT)
Dept: OBGYN CLINIC | Facility: CLINIC | Age: 37
End: 2022-05-20

## 2022-05-20 NOTE — TELEPHONE ENCOUNTER
Pt stated she called 2 days ago to get a referral for radiology to have her faliopial  tubes looked at

## 2022-05-23 NOTE — TELEPHONE ENCOUNTER
Pt informed of contrast shortage @ present time  She states she is still awaiting records to determine if had fallopian tubes removed with ectopic in 2010 & 2011 ([facility])  States she already signed records release  She will contact medical records again

## 2022-11-07 ENCOUNTER — TELEPHONE (OUTPATIENT)
Dept: INTERNAL MEDICINE CLINIC | Facility: CLINIC | Age: 37
End: 2022-11-07

## 2022-11-07 NOTE — TELEPHONE ENCOUNTER
Tried calling patient regarding missed appointment  Number was not in service   Tried calling patients mom, phone kept ringing

## 2022-11-18 ENCOUNTER — HOSPITAL ENCOUNTER (EMERGENCY)
Facility: HOSPITAL | Age: 37
Discharge: HOME | End: 2022-11-18
Attending: EMERGENCY MEDICINE

## 2022-11-18 VITALS
HEART RATE: 103 BPM | BODY MASS INDEX: 36.08 KG/M2 | DIASTOLIC BLOOD PRESSURE: 84 MMHG | RESPIRATION RATE: 18 BRPM | TEMPERATURE: 97.9 F | HEIGHT: 64 IN | SYSTOLIC BLOOD PRESSURE: 123 MMHG | OXYGEN SATURATION: 100 %

## 2022-11-18 DIAGNOSIS — N39.0 UTI (URINARY TRACT INFECTION): ICD-10-CM

## 2022-11-18 DIAGNOSIS — N93.9 VAGINAL BLEEDING: Primary | ICD-10-CM

## 2022-11-18 LAB
BACTERIA UR QL AUTO: ABNORMAL /HPF
BILIRUB UR QL STRIP: NEGATIVE
CLARITY UR: CLEAR
COLOR UR: YELLOW
EXT PREGNANCY TEST URINE: NEGATIVE
EXT. CONTROL: NORMAL
GLUCOSE UR STRIP-MCNC: NEGATIVE MG/DL
HGB UR QL STRIP.AUTO: NEGATIVE
KETONES UR STRIP-MCNC: NEGATIVE MG/DL
LEUKOCYTE ESTERASE UR QL STRIP: ABNORMAL
NITRITE UR QL STRIP: NEGATIVE
NON-SQ EPI CELLS URNS QL MICRO: ABNORMAL /HPF
PH UR STRIP.AUTO: 7 [PH] (ref 4.5–8)
PROT UR STRIP-MCNC: NEGATIVE MG/DL
RBC #/AREA URNS AUTO: ABNORMAL /HPF
SP GR UR STRIP.AUTO: >=1.03 (ref 1–1.03)
UROBILINOGEN UR QL STRIP.AUTO: 1 E.U./DL
WBC #/AREA URNS AUTO: ABNORMAL /HPF

## 2022-11-18 RX ORDER — NITROFURANTOIN 25; 75 MG/1; MG/1
100 CAPSULE ORAL 2 TIMES DAILY
Qty: 10 CAPSULE | Refills: 0 | Status: SHIPPED | OUTPATIENT
Start: 2022-11-18

## 2022-11-18 NOTE — DISCHARGE INSTRUCTIONS
Take Macrobid for 7 days  Follow up with OB and PCP in a week  If you have worsening symptoms including back pain , fever, come back to the ED

## 2022-11-18 NOTE — ED ATTENDING ATTESTATION
11/18/2022  Dafne RICHMOND DO, saw and evaluated the patient  I have discussed the patient with the resident/non-physician practitioner and agree with the resident's/non-physician practitioner's findings, Plan of Care, and MDM as documented in the resident's/non-physician practitioner's note, except where noted  All available labs and Radiology studies were reviewed  I was present for key portions of any procedure(s) performed by the resident/non-physician practitioner and I was immediately available to provide assistance  At this point I agree with the current assessment done in the Emergency Department  I have conducted an independent evaluation of this patient a history and physical is as follows:    Patient is a healthy 46-year-old female, [id number], from 2 prior ectopic pregnancies, says that her menstrual cycle is normally very regular, had her cycle November 2nd through November 7th  She woke this morning, went to the bathroom and thought she had some slight vaginal bleeding  She then went to the bathroom again and did not notice any additional vaginal bleeding or hematuria  She says she feels okay right now, she is sexually active with a male partner, does use bare devices, no vaginal discharge, no discomfort during intercourse and her partner has no symptoms  She has no fever, no chills, does have some slight increased urinary frequency over the last 24 hours but no dysuria or hematuria  She has never had vaginal bleeding before  She just moved to the area, does not have a gynecologist so she came to the ED for evaluation      General:  Patient is well-appearing  Head:  Atraumatic  Eyes:  Conjunctiva pink  ENT:  Mucous membranes are moist  Neck:  Supple  Cardiac:  S1-S2, without murmurs  Lungs:  Clear to auscultation bilaterally  Abdomen:  Soft, nontender, normal bowel sounds, no CVA tenderness, no tympany, no rigidity, no guarding  Extremities:  Normal range of motion  Neurologic:  Awake, fluent speech, normal comprehension  AAOx3  Skin:  Pink warm and dry  Psychiatric:  Alert, pleasant, cooperative            ED Course     Labs Reviewed   URINE MICROSCOPIC - Abnormal       Result Value Ref Range Status    RBC, UA None Seen  None Seen, 1-2 /hpf Final    WBC, UA 20-30 (*) None Seen, 1-2 /hpf Final    Epithelial Cells Occasional  None Seen, Occasional /hpf Final    Bacteria, UA Occasional  None Seen, Occasional /hpf Final   URINE MACROSCOPIC, POC - Abnormal    Color, UA Yellow   Final    Clarity, UA Clear   Final    pH, UA 7 0  4 5 - 8 0 Final    Leukocytes, UA Trace (*) Negative Final    Nitrite, UA Negative  Negative Final    Protein, UA Negative  Negative mg/dl Final    Glucose, UA Negative  Negative mg/dl Final    Ketones, UA Negative  Negative mg/dl Final    Urobilinogen, UA 1 0  0 2, 1 0 E U /dl E U /dl Final    Bilirubin, UA Negative  Negative Final    Occult Blood, UA Negative  Negative Final    Specific Gravity, UA >=1 030  1 003 - 1 030 Final    Narrative:     CLINITEK RESULT   POCT PREGNANCY, URINE - Normal    EXT Preg Test, Ur Negative   Final    Control Valid   Final   URINE CULTURE       Patient overall well appearing, her proceed vaginal bleeding had resolved prior coming to the ED  This point do not believe pelvic exam would be clinically useful  May have slight hematuria as opposed to vaginal bleeding  No evidence of pyelonephritis, overall well-appearing, will treat with antibiotics and outpatient follow-up  Supportive care, importance of follow-up and return precautions were discussed with the patient, who expressed understanding        Critical Care Time  Procedures

## 2022-11-18 NOTE — ED PROVIDER NOTES
History  Chief Complaint   Patient presents with   • Vaginal Bleeding     Patient presents with vaginal bleeding  Had menstrual cycle 11/2-11/7 and woke up with morning with more vaginal bleeding which she reports is abnormal for her  31 Y O F, hx of 2 ectopic pregnancies in the distant past presents for vaginal bleeding  States that she had a period from 11/2-11/7 and normally her periods are very regular  She also has some urinary urgency since yesterday without associated burning or hematuria  She did not have any further episodes of vaginal bleeding  Denies vaginal discharge or rash, abdominal pain  Denies fever, chills, nausea, vomiting, CP, SOB, diarrhea, melena  She is sexually active with one partner and uses condoms  Prior to Admission Medications   Prescriptions Last Dose Informant Patient Reported? Taking?   emtricitabine-tenofovir (TRUVADA) 200-300 mg per tablet   No No   Sig: Take 1 tablet by mouth daily      Facility-Administered Medications: None       Past Medical History:   Diagnosis Date   • Abnormal Pap smear of cervix    • Anxiety    • Depression        Past Surgical History:   Procedure Laterality Date   • ABDOMINAL SURGERY     • TUBAL LIGATION Bilateral        Family History   Problem Relation Age of Onset   • Diabetes Mother    • Thyroid nodules Mother    • Diabetes Father    • Autism Son    • Bipolar disorder Son    • Depression Son    • ADD / ADHD Son    • Hypothyroidism Sister    • Thyroid nodules Sister    • Alcohol abuse Maternal Grandmother    • Stroke Maternal Grandmother    • Heart attack Maternal Grandfather    • Stomach cancer Paternal Grandmother    • Thyroid nodules Sister      I have reviewed and agree with the history as documented      E-Cigarette/Vaping   • E-Cigarette Use Never User      E-Cigarette/Vaping Substances   • Nicotine No    • THC No    • CBD No    • Flavoring No    • Other No    • Unknown No      Social History     Tobacco Use   • Smoking status: Every Day     Packs/day: 0 25     Types: Cigarettes   • Smokeless tobacco: Never   Vaping Use   • Vaping Use: Never used   Substance Use Topics   • Alcohol use: Yes     Comment: occ   • Drug use: No        Review of Systems   Constitutional: Negative for chills and fever  HENT: Negative for ear pain and sore throat  Eyes: Negative for pain and visual disturbance  Respiratory: Negative for cough and shortness of breath  Cardiovascular: Negative for chest pain and palpitations  Gastrointestinal: Negative for abdominal pain and vomiting  Genitourinary: Positive for vaginal bleeding  Negative for dysuria and hematuria  Musculoskeletal: Negative for arthralgias and back pain  Skin: Negative for color change and rash  Neurological: Negative for seizures and syncope  All other systems reviewed and are negative  Physical Exam  ED Triage Vitals [11/18/22 1220]   Temperature Pulse Respirations Blood Pressure SpO2   97 9 °F (36 6 °C) 103 18 123/84 100 %      Temp Source Heart Rate Source Patient Position - Orthostatic VS BP Location FiO2 (%)   Oral Monitor Sitting Left arm --      Pain Score       --             Orthostatic Vital Signs  Vitals:    11/18/22 1220   BP: 123/84   Pulse: 103   Patient Position - Orthostatic VS: Sitting       Physical Exam  Vitals and nursing note reviewed  Constitutional:       General: She is not in acute distress  Appearance: Normal appearance  She is well-developed  She is not ill-appearing, toxic-appearing or diaphoretic  HENT:      Head: Normocephalic and atraumatic  Eyes:      Extraocular Movements: Extraocular movements intact  Conjunctiva/sclera: Conjunctivae normal       Pupils: Pupils are equal, round, and reactive to light  Cardiovascular:      Rate and Rhythm: Normal rate and regular rhythm  Heart sounds: Normal heart sounds  No murmur heard  Pulmonary:      Effort: Pulmonary effort is normal  No respiratory distress        Breath sounds: Normal breath sounds  No stridor  No wheezing, rhonchi or rales  Abdominal:      General: There is no distension  Palpations: Abdomen is soft  Tenderness: There is no abdominal tenderness  There is no right CVA tenderness, left CVA tenderness, guarding or rebound  Musculoskeletal:         General: No swelling, tenderness, deformity or signs of injury  Cervical back: Neck supple  No rigidity or tenderness  Right lower leg: No edema  Left lower leg: No edema  Skin:     General: Skin is warm and dry  Capillary Refill: Capillary refill takes less than 2 seconds  Coloration: Skin is not jaundiced or pale  Neurological:      General: No focal deficit present  Mental Status: She is alert and oriented to person, place, and time  Psychiatric:         Mood and Affect: Mood normal          Behavior: Behavior normal          ED Medications  Medications - No data to display    Diagnostic Studies  Results Reviewed     Procedure Component Value Units Date/Time    Urine Microscopic [[id number]]  (Abnormal) Collected: 11/18/22 1235    Lab Status: Final result Specimen: Urine, Clean Catch Updated: 11/18/22 1306     RBC, UA None Seen /hpf      WBC, UA 20-30 /hpf      Epithelial Cells Occasional /hpf      Bacteria, UA Occasional /hpf     Urine culture [[id number]] Collected: 11/18/22 1235    Lab Status:  In process Specimen: Urine, Clean Catch Updated: 11/18/22 1306    POCT pregnancy, urine [[id number]]  (Normal) Resulted: 11/18/22 1238    Lab Status: Final result Updated: 11/18/22 1238     EXT Preg Test, Ur Negative     Control Valid    Urine Macroscopic, POC [[id number]]  (Abnormal) Collected: 11/18/22 1235    Lab Status: Final result Specimen: Urine Updated: 11/18/22 1237     Color, UA Yellow     Clarity, UA Clear     pH, UA 7 0     Leukocytes, UA Trace     Nitrite, UA Negative     Protein, UA Negative mg/dl      Glucose, UA Negative mg/dl      Ketones, UA Negative mg/dl Urobilinogen, UA 1 0 E U /dl      Bilirubin, UA Negative     Occult Blood, UA Negative     Specific Gravity, UA >=1 030    Narrative:      CLIN[facility]K RESULT                 No orders to display         Procedures  Procedures      ED Course                             SBIRT 20yo+    Flowsheet Row Most Recent Value   SBIRT (25 yo +)    In order to provide better care to our patients, we are screening all of our patients for alcohol and drug use  Would it be okay to ask you these screening questions? Yes Filed at: 11/18/2022 1225   Initial Alcohol Screen: US AUDIT-C     1  How often do you have a drink containing alcohol? 0 Filed at: 11/18/2022 1225   2  How many drinks containing alcohol do you have on a typical day you are drinking? 0 Filed at: 11/18/2022 1225   3a  Male UNDER 65: How often do you have five or more drinks on one occasion? 0 Filed at: 11/18/2022 1225   3b  FEMALE Any Age, or MALE 65+: How often do you have 4 or more drinks on one occassion? 0 Filed at: 11/18/2022 1225   Audit-C Score 0 Filed at: 11/18/2022 1225   VIRY: How many times in the past year have you    Used an illegal drug or used a prescription medication for non-medical reasons? Never Filed at: 11/18/2022 1225                MDM  Number of Diagnoses or Management Options  UTI (urinary tract infection)  Vaginal bleeding  Diagnosis management comments:  31 Y O F, hx of 2 ectopic pregnancies in the distant past presents for vaginal bleeding  PE benign  Urine preg negative  Pt is had one episode of mild bleeding in the morning and currently does not have additional bleeding and no associated abdominal pain, vaginal discharge  Urine positive for UTI  Pt' was discharged with macrobid and outpatient followup with OB/GYN         Disposition  Final diagnoses:   Vaginal bleeding   UTI (urinary tract infection)     Time reflects when diagnosis was documented in both MDM as applicable and the Disposition within this note     Time User Action Codes Description Comment    2022  1:15 PM Preethi Coreas Add [N93 9] Vaginal bleeding     2022  1:17 PM Lizett Sanabria Add [N39 0] UTI (urinary tract infection)       ED Disposition     ED Disposition   Discharge    Condition   Stable    Date/Time     1:24 PM    Comment   [facility] Adjutant discharge to home/self care  Follow-up Information     Follow up With Specialties Details Why [address] [facility] Emergency Department Emergency Medicine  If symptoms worsen [address] [id number]   [facility] [address] [facility] Emergency Department, [address] [place] [address], [facility] Obstetrics and Gynecology In 1 week  [address] [facility] [address] [place] [facility] [place]  [address] In 1 week  [address] [facility] [id number]  [address], [address] [facility]          Discharge Medication List as of 2022  1:25 PM      START taking these medications    Details   nitrofurantoin (MACROBID) 100 mg capsule Take 1 capsule (100 mg total) by mouth 2 (two) times a day, Starting Fri 2022, Normal         CONTINUE these medications which have NOT CHANGED    Details   emtricitabine-tenofovir (TRUVADA) 200-300 mg per tablet Take 1 tablet by mouth daily, Starting Mon 2022, Normal           No discharge procedures on file  PDMP Review     None           ED Provider  Attending physically available and evaluated [employer] Adjutant  I managed the patient along with the ED Attending      Electronically Signed by         Damon Cano DO  22

## 2022-11-20 LAB — BACTERIA UR CULT: ABNORMAL

## 2022-11-21 ENCOUNTER — TELEPHONE (OUTPATIENT)
Dept: OBGYN CLINIC | Facility: CLINIC | Age: 37
End: 2022-11-21

## 2023-10-31 ENCOUNTER — TELEPHONE (OUTPATIENT)
Dept: INTERNAL MEDICINE CLINIC | Facility: CLINIC | Age: 38
End: 2023-10-31

## 2023-10-31 NOTE — TELEPHONE ENCOUNTER
Received through the mail discharge summary of patient 10/17/2023 at [facility] ED    Scanned to chart for doctor to review

## 2025-02-26 ENCOUNTER — TELEPHONE (OUTPATIENT)
Dept: INTERNAL MEDICINE CLINIC | Facility: CLINIC | Age: 40
End: 2025-02-26